# Patient Record
Sex: FEMALE | Race: WHITE
[De-identification: names, ages, dates, MRNs, and addresses within clinical notes are randomized per-mention and may not be internally consistent; named-entity substitution may affect disease eponyms.]

---

## 2008-02-24 VITALS — SYSTOLIC BLOOD PRESSURE: 160 MMHG

## 2019-08-13 ENCOUNTER — HOSPITAL ENCOUNTER (OUTPATIENT)
Dept: HOSPITAL 6 - MED/SURG | Age: 84
Setting detail: OBSERVATION
LOS: 1 days | Discharge: HOME | End: 2019-08-14
Attending: NURSE PRACTITIONER | Admitting: NURSE PRACTITIONER
Payer: MEDICARE

## 2019-08-13 VITALS — DIASTOLIC BLOOD PRESSURE: 78 MMHG | SYSTOLIC BLOOD PRESSURE: 162 MMHG

## 2019-08-13 VITALS — DIASTOLIC BLOOD PRESSURE: 80 MMHG | SYSTOLIC BLOOD PRESSURE: 162 MMHG

## 2019-08-13 VITALS — SYSTOLIC BLOOD PRESSURE: 166 MMHG | DIASTOLIC BLOOD PRESSURE: 84 MMHG

## 2019-08-13 VITALS — DIASTOLIC BLOOD PRESSURE: 75 MMHG | SYSTOLIC BLOOD PRESSURE: 128 MMHG

## 2019-08-13 VITALS — HEIGHT: 65 IN | BODY MASS INDEX: 25.34 KG/M2 | WEIGHT: 152.12 LBS

## 2019-08-13 DIAGNOSIS — E11.9: ICD-10-CM

## 2019-08-13 DIAGNOSIS — G40.909: ICD-10-CM

## 2019-08-13 DIAGNOSIS — Z88.0: ICD-10-CM

## 2019-08-13 DIAGNOSIS — R55: Primary | ICD-10-CM

## 2019-08-13 DIAGNOSIS — I10: ICD-10-CM

## 2019-08-13 DIAGNOSIS — F41.9: ICD-10-CM

## 2019-08-13 DIAGNOSIS — Z87.891: ICD-10-CM

## 2019-08-13 DIAGNOSIS — Z79.84: ICD-10-CM

## 2019-08-13 DIAGNOSIS — Z82.49: ICD-10-CM

## 2019-08-13 DIAGNOSIS — Z80.0: ICD-10-CM

## 2019-08-13 LAB
CALCIUM SERPL-MCNC: 9.2 MG/DL (ref 8.3–10.5)
CO2 SERPL-SCNC: 22 MMOL/L (ref 23–31)
GLUCOSE SERPL-MCNC: 141 MG/DL (ref 65–105)
POTASSIUM SERPL-SCNC: 4 MMOL/L (ref 3.5–5.1)
SODIUM SERPL-SCNC: 138 MMOL/L (ref 136–145)
TROPONIN I SERPL-MCNC: < 0.03 NG/ML (ref ?–0.03)

## 2019-08-13 PROCEDURE — G0378 HOSPITAL OBSERVATION PER HR: HCPCS

## 2019-08-14 VITALS — SYSTOLIC BLOOD PRESSURE: 122 MMHG | DIASTOLIC BLOOD PRESSURE: 64 MMHG

## 2019-08-14 VITALS — SYSTOLIC BLOOD PRESSURE: 163 MMHG | DIASTOLIC BLOOD PRESSURE: 79 MMHG

## 2019-08-14 VITALS — SYSTOLIC BLOOD PRESSURE: 146 MMHG | DIASTOLIC BLOOD PRESSURE: 70 MMHG

## 2019-08-14 LAB
CALCIUM SERPL-MCNC: 8.7 MG/DL (ref 8.3–10.5)
CO2 SERPL-SCNC: 24 MMOL/L (ref 23–31)
ERYTHROCYTE [DISTWIDTH] IN BLOOD BY AUTOMATED COUNT: 13.2 % (ref 11.5–14.5)
GLUCOSE SERPL-MCNC: 91 MG/DL (ref 65–105)
HCT VFR BLD AUTO: 28.9 % (ref 37–47)
HGB BLD-MCNC: 9.6 G/DL (ref 12.5–16)
LYMPHOCYTES NFR BLD MANUAL: 30 % (ref 20–51)
MCH RBC QN AUTO: 29 PG (ref 27–31)
MCHC RBC AUTO-ENTMCNC: 33 G/DL (ref 33–37)
MCV RBC AUTO: 88 FL (ref 78–100)
MONOCYTES NFR BLD: 15 % (ref 3–10)
NEUTS SEG NFR BLD MANUAL: 53 % (ref 42–75)
PLATELET # BLD AUTO: 219 K/MM3 (ref 130–400)
PMV BLD AUTO: 8.7 FL (ref 7.4–10.4)
POTASSIUM SERPL-SCNC: 4 MMOL/L (ref 3.5–5.1)
RBC # BLD AUTO: 3.28 M/MM3 (ref 4.1–5.3)
SODIUM SERPL-SCNC: 137 MMOL/L (ref 136–145)
WBC # BLD AUTO: 3.8 K/MM3 (ref 4.8–10.8)

## 2021-11-02 ENCOUNTER — HOSPITAL ENCOUNTER (INPATIENT)
Dept: HOSPITAL 19 - COL.ER | Age: 86
LOS: 4 days | Discharge: NURSING, REHAB OR LONG-TERM CARE | DRG: 481 | End: 2021-11-06
Attending: STUDENT IN AN ORGANIZED HEALTH CARE EDUCATION/TRAINING PROGRAM | Admitting: STUDENT IN AN ORGANIZED HEALTH CARE EDUCATION/TRAINING PROGRAM
Payer: MEDICARE

## 2021-11-02 VITALS — SYSTOLIC BLOOD PRESSURE: 124 MMHG | HEART RATE: 67 BPM | TEMPERATURE: 97.8 F | DIASTOLIC BLOOD PRESSURE: 55 MMHG

## 2021-11-02 VITALS — BODY MASS INDEX: 31.95 KG/M2 | WEIGHT: 180.34 LBS | HEIGHT: 62.99 IN

## 2021-11-02 VITALS — TEMPERATURE: 98.4 F | SYSTOLIC BLOOD PRESSURE: 185 MMHG | DIASTOLIC BLOOD PRESSURE: 81 MMHG | HEART RATE: 79 BPM

## 2021-11-02 VITALS — SYSTOLIC BLOOD PRESSURE: 181 MMHG | DIASTOLIC BLOOD PRESSURE: 84 MMHG

## 2021-11-02 DIAGNOSIS — N17.9: ICD-10-CM

## 2021-11-02 DIAGNOSIS — I10: ICD-10-CM
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
## 2021-11-02 DIAGNOSIS — I27.20: ICD-10-CM

## 2021-11-02 DIAGNOSIS — D64.9: ICD-10-CM
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
## 2021-11-02 DIAGNOSIS — Z79.84: ICD-10-CM

## 2021-11-02 DIAGNOSIS — E78.5: ICD-10-CM

## 2021-11-02 DIAGNOSIS — F03.90: ICD-10-CM

## 2021-11-02 DIAGNOSIS — E11.9: ICD-10-CM

## 2021-11-02 DIAGNOSIS — E87.1: ICD-10-CM

## 2021-11-02 DIAGNOSIS — F41.9: ICD-10-CM

## 2021-11-02 DIAGNOSIS — S72.142A: Primary | ICD-10-CM

## 2021-11-02 DIAGNOSIS — I16.0: ICD-10-CM

## 2021-11-02 DIAGNOSIS — G40.909: ICD-10-CM

## 2021-11-02 LAB
ANION GAP SERPL CALC-SCNC: 12 MMOL/L (ref 7–16)
BASOPHILS # BLD: 0 K/MM3 (ref 0–0.2)
BASOPHILS NFR BLD AUTO: 0.4 % (ref 0–2)
BUN SERPL-MCNC: 24 MG/DL (ref 10–20)
CALCIUM SERPL-MCNC: 9.4 MG/DL (ref 8.4–10.2)
CHLORIDE SERPL-SCNC: 105 MMOL/L (ref 98–107)
CO2 SERPL-SCNC: 23 MMOL/L (ref 23–31)
CREAT SERPL-SCNC: 1.12 MG/DL (ref 0.57–1.11)
EOSINOPHIL # BLD: 0.1 K/MM3 (ref 0–0.7)
EOSINOPHIL NFR BLD: 1.1 % (ref 0–4)
ERYTHROCYTE [DISTWIDTH] IN BLOOD BY AUTOMATED COUNT: 14 % (ref 11.5–14.5)
GLUCOSE SERPL-MCNC: 114 MG/DL (ref 70–99)
GRANULOCYTES # BLD AUTO: 71.1 % (ref 42.2–75.2)
HCT VFR BLD AUTO: 32.5 % (ref 37–47)
HGB BLD-MCNC: 10.6 G/DL (ref 12.5–16)
LYMPHOCYTES # BLD: 1 K/MM3 (ref 1.2–3.4)
LYMPHOCYTES NFR BLD: 17.7 % (ref 20–51)
MCH RBC QN AUTO: 29 PG (ref 27–31)
MCHC RBC AUTO-ENTMCNC: 33 G/DL (ref 33–37)
MCV RBC AUTO: 89 FL (ref 80–100)
MONOCYTES # BLD: 0.5 K/MM3 (ref 0.1–0.6)
MONOCYTES NFR BLD AUTO: 9.3 % (ref 1.7–9.3)
NEUTROPHILS # BLD: 3.9 K/MM3 (ref 1.4–6.5)
PH UR STRIP.AUTO: 6 [PH] (ref 5–8)
PLATELET # BLD AUTO: 230 K/MM3 (ref 130–400)
PMV BLD AUTO: 8.8 FL (ref 7.4–10.4)
POTASSIUM SERPL-SCNC: 4.1 MMOL/L (ref 3.5–4.5)
RBC # BLD AUTO: 3.64 M/MM3 (ref 4.1–5.3)
RBC # UR: (no result) /HPF
SODIUM SERPL-SCNC: 140 MMOL/L (ref 136–145)
SP GR UR STRIP.AUTO: 1.01 (ref 1–1.03)
SQUAMOUS # URNS: (no result) /HPF
URN COLLECT METHOD CLASS: (no result)
WBC # UR: (no result) /HPF

## 2021-11-02 PROCEDURE — C1751 CATH, INF, PER/CENT/MIDLINE: HCPCS

## 2021-11-02 PROCEDURE — A4314 CATH W/DRAINAGE 2-WAY LATEX: HCPCS

## 2021-11-02 PROCEDURE — C1713 ANCHOR/SCREW BN/BN,TIS/BN: HCPCS

## 2021-11-02 PROCEDURE — A9284 NON-ELECTRONIC SPIROMETER: HCPCS

## 2021-11-02 PROCEDURE — C1892 INTRO/SHEATH,FIXED,PEEL-AWAY: HCPCS

## 2021-11-02 NOTE — NUR
When report recevied per ER nurse, patient was hypertensive 200s/100s.
Requested this be brought to the providers attention and acknowledged prior to
bringing patient up to floor.

## 2021-11-02 NOTE — NUR
Patient alert, confused.  See assessment.  LLE with 1+ pulses palpable, warm
to touch.  LLE shortened and externally rotated.  Farley catheter present on
admission to unit, draining clear yellow urine.  No open areas or pressure
areas noted on skin.  No c/o at this time.

## 2021-11-03 VITALS — DIASTOLIC BLOOD PRESSURE: 74 MMHG | HEART RATE: 68 BPM | SYSTOLIC BLOOD PRESSURE: 147 MMHG

## 2021-11-03 VITALS — DIASTOLIC BLOOD PRESSURE: 80 MMHG | TEMPERATURE: 97.3 F | HEART RATE: 78 BPM | SYSTOLIC BLOOD PRESSURE: 220 MMHG

## 2021-11-03 VITALS — DIASTOLIC BLOOD PRESSURE: 80 MMHG | TEMPERATURE: 98.7 F | HEART RATE: 71 BPM | SYSTOLIC BLOOD PRESSURE: 169 MMHG

## 2021-11-03 VITALS — DIASTOLIC BLOOD PRESSURE: 54 MMHG | SYSTOLIC BLOOD PRESSURE: 117 MMHG | HEART RATE: 70 BPM

## 2021-11-03 VITALS — SYSTOLIC BLOOD PRESSURE: 115 MMHG | DIASTOLIC BLOOD PRESSURE: 49 MMHG | HEART RATE: 72 BPM

## 2021-11-03 VITALS — TEMPERATURE: 98.1 F | DIASTOLIC BLOOD PRESSURE: 65 MMHG | SYSTOLIC BLOOD PRESSURE: 129 MMHG | HEART RATE: 62 BPM

## 2021-11-03 VITALS — SYSTOLIC BLOOD PRESSURE: 209 MMHG | DIASTOLIC BLOOD PRESSURE: 85 MMHG

## 2021-11-03 VITALS — TEMPERATURE: 97.5 F | SYSTOLIC BLOOD PRESSURE: 115 MMHG | DIASTOLIC BLOOD PRESSURE: 48 MMHG | HEART RATE: 63 BPM

## 2021-11-03 VITALS — DIASTOLIC BLOOD PRESSURE: 51 MMHG | HEART RATE: 65 BPM | TEMPERATURE: 98.3 F | SYSTOLIC BLOOD PRESSURE: 116 MMHG

## 2021-11-03 VITALS — HEART RATE: 67 BPM | DIASTOLIC BLOOD PRESSURE: 85 MMHG | SYSTOLIC BLOOD PRESSURE: 143 MMHG

## 2021-11-03 VITALS — DIASTOLIC BLOOD PRESSURE: 60 MMHG | SYSTOLIC BLOOD PRESSURE: 146 MMHG | HEART RATE: 60 BPM

## 2021-11-03 VITALS — DIASTOLIC BLOOD PRESSURE: 64 MMHG | SYSTOLIC BLOOD PRESSURE: 138 MMHG

## 2021-11-03 VITALS — SYSTOLIC BLOOD PRESSURE: 150 MMHG | DIASTOLIC BLOOD PRESSURE: 65 MMHG | HEART RATE: 63 BPM | TEMPERATURE: 98.1 F

## 2021-11-03 VITALS — HEART RATE: 61 BPM | DIASTOLIC BLOOD PRESSURE: 60 MMHG | SYSTOLIC BLOOD PRESSURE: 141 MMHG

## 2021-11-03 LAB
ANION GAP SERPL CALC-SCNC: 12 MMOL/L (ref 7–16)
BUN SERPL-MCNC: 21 MG/DL (ref 10–20)
CALCIUM SERPL-MCNC: 9 MG/DL (ref 8.4–10.2)
CHLORIDE SERPL-SCNC: 101 MMOL/L (ref 98–107)
CO2 SERPL-SCNC: 24 MMOL/L (ref 23–31)
CREAT SERPL-SCNC: 0.95 MG/DL (ref 0.57–1.11)
ERYTHROCYTE [DISTWIDTH] IN BLOOD BY AUTOMATED COUNT: 13.9 % (ref 11.5–14.5)
GLUCOSE SERPL-MCNC: 147 MG/DL (ref 70–99)
HCT VFR BLD AUTO: 28 % (ref 37–47)
HGB BLD-MCNC: 9.4 G/DL (ref 12.5–16)
MCH RBC QN AUTO: 29 PG (ref 27–31)
MCHC RBC AUTO-ENTMCNC: 34 G/DL (ref 33–37)
MCV RBC AUTO: 88 FL (ref 80–100)
PLATELET # BLD AUTO: 232 K/MM3 (ref 130–400)
PMV BLD AUTO: 9.3 FL (ref 7.4–10.4)
POTASSIUM SERPL-SCNC: 3.8 MMOL/L (ref 3.5–4.5)
RBC # BLD AUTO: 3.2 M/MM3 (ref 4.1–5.3)
SODIUM SERPL-SCNC: 137 MMOL/L (ref 136–145)

## 2021-11-03 PROCEDURE — 0QS704Z REPOSITION LEFT UPPER FEMUR WITH INTERNAL FIXATION DEVICE, OPEN APPROACH: ICD-10-PCS | Performed by: ORTHOPAEDIC SURGERY

## 2021-11-03 PROCEDURE — 02HV33Z INSERTION OF INFUSION DEVICE INTO SUPERIOR VENA CAVA, PERCUTANEOUS APPROACH: ICD-10-PCS

## 2021-11-03 NOTE — NUR
Patient returns post op at 1650.  Assessment unchanged except LLE dressing
CDI, pulses palpable.  SCDs, TEDs in place.  Post op checks initiated.  No c/o
at this time.

## 2021-11-03 NOTE — NUR
Medication record received per patients daughter at shift change last night.
Dr Guerra notifed approx 1730 last night that med rec was not current.
Changes in medications per med rec noted this morning, requested Dr Guerra to
review new med rec.  Also requested HARMONY Srinivasan to review med rec.

## 2021-11-03 NOTE — NUR
attempted to meet with patient, however she was down in surgery.
SW contacted patient's daughter, Fawad to discuss discharge planning. Fawad
(ph#[phone]) advised that patient lives with her , Adrian in
[place] and sees Dr. Marcum for primary care. Patient obtains her
medications from [facility] in [place] with no difficulties. Patient
normally does not use any DME and is independent with ADLS. Fawad reports that
she is going to check patient's home for DPOA-HC and will bring it in if she
finds it. LESLIE discussed rehab options with Fawad who is in agreement that
patient will need rehab but would like to review SNF options with patient and
Adrian before sending referrals. Fawad states she will discuss this with them
tonight so that referrals can be sent tomorrow.
 
Discharge Plan: Post acute rehab, SW to follow up on preferences tomorrow.

## 2021-11-03 NOTE — NUR
Patient alert, confused.  Repeats questions and statements.  LLE with pulses
palpable, sensation intact.  LLE shortened and externally rotated.  Farley
catheter in place, patent, draining clear yellow urine.  SCDs and TEDs in
place.  No c/o at this time.

## 2021-11-03 NOTE — NUR
ALERT AND OX2. SELF AND SITUATION. LT HIP FX. BEDREST. . TELE NS. WIN TO DD
YELLOW CLEAR URINE.  RT AC, FLUIDS STARTED AFTER NPO STATUS. NORCO FOR PAIN.
POC DISCUSSED. MED REC UPDATED. RA. POC DISCUSSED W PT WITH AND VU OF CARE.
CALL LIGHT WI REACH AND HAS USED APPROPRIATLY TONIGHT. NPO NOW.

## 2021-11-04 VITALS — TEMPERATURE: 97.4 F | DIASTOLIC BLOOD PRESSURE: 53 MMHG | HEART RATE: 74 BPM | SYSTOLIC BLOOD PRESSURE: 128 MMHG

## 2021-11-04 VITALS — TEMPERATURE: 98.3 F | SYSTOLIC BLOOD PRESSURE: 129 MMHG | DIASTOLIC BLOOD PRESSURE: 52 MMHG | HEART RATE: 72 BPM

## 2021-11-04 VITALS — HEART RATE: 64 BPM | DIASTOLIC BLOOD PRESSURE: 44 MMHG | SYSTOLIC BLOOD PRESSURE: 100 MMHG

## 2021-11-04 VITALS — TEMPERATURE: 97.1 F | DIASTOLIC BLOOD PRESSURE: 46 MMHG | SYSTOLIC BLOOD PRESSURE: 100 MMHG | HEART RATE: 62 BPM

## 2021-11-04 VITALS — DIASTOLIC BLOOD PRESSURE: 37 MMHG | TEMPERATURE: 97.8 F | SYSTOLIC BLOOD PRESSURE: 89 MMHG | HEART RATE: 59 BPM

## 2021-11-04 VITALS — HEART RATE: 74 BPM | DIASTOLIC BLOOD PRESSURE: 58 MMHG | SYSTOLIC BLOOD PRESSURE: 129 MMHG | TEMPERATURE: 98.3 F

## 2021-11-04 VITALS — HEART RATE: 72 BPM | TEMPERATURE: 98.4 F | DIASTOLIC BLOOD PRESSURE: 48 MMHG | SYSTOLIC BLOOD PRESSURE: 106 MMHG

## 2021-11-04 VITALS — HEART RATE: 70 BPM | TEMPERATURE: 97.9 F | SYSTOLIC BLOOD PRESSURE: 115 MMHG | DIASTOLIC BLOOD PRESSURE: 61 MMHG

## 2021-11-04 LAB
ANION GAP SERPL CALC-SCNC: 9 MMOL/L (ref 7–16)
BUN SERPL-MCNC: 18 MG/DL (ref 10–20)
CALCIUM SERPL-MCNC: 7.9 MG/DL (ref 8.4–10.2)
CHLORIDE SERPL-SCNC: 103 MMOL/L (ref 98–107)
CO2 SERPL-SCNC: 22 MMOL/L (ref 23–31)
CREAT SERPL-SCNC: 0.91 MG/DL (ref 0.57–1.11)
CREAT SERPL-SCNC: 0.91 MG/DL (ref 0.57–1.11)
FRACT EXCRET NA UR+SERPL-RTO: 0.1 %
GLUCOSE SERPL-MCNC: 134 MG/DL (ref 70–99)
HCT VFR BLD AUTO: 24.4 % (ref 37–47)
HGB BLD-MCNC: 8 G/DL (ref 12.5–16)
POTASSIUM SERPL-SCNC: 3.6 MMOL/L (ref 3.5–4.5)
SODIUM SERPL-SCNC: 134 MMOL/L (ref 136–145)
SODIUM SERPL-SCNC: 134 MMOL/L (ref 136–145)

## 2021-11-04 NOTE — NUR
Uneventful day, up to the recliner for a large part of the day. Pain minimal,
but controlled with scheduled Tylenol and ice to site. Pt tolerating diet
without issues. No needs at this time.

## 2021-11-04 NOTE — NUR
PT RESTING QUIETLY IN BED, IS PLEASANTLY CONFUSED, STATES THAT SHE DOESN'T
REMEMBER WHAT HAPPENED TO PUT HER HERE. ATTEMPTED TO RE-ORIENT PT BUT SHE
QUICKLY FORGETS AGAIN. PT DENIES ANY PAIN @ THIS TIME. DRESSING TO LEFT HIP
CDI. WIN CATHETER DRAINING CLEAR YELLOW URINE. IVF INFUSING INTO PICC.
RESPIRATIONS UNLABORED. BED ALARM IS ON, CALL LIGHT WITHIN REACH.

## 2021-11-04 NOTE — NUR
Visited with the patient and the patients daughter Fawad in patients room that
[facility] has accepted the patient once she is ready for dc. Fawad states
that as long as there is assistance with getting the patient in the car and
out of the car, she will be able to transport the patient.

## 2021-11-04 NOTE — NUR
Pt assessment complete. Pt laying in bed upon entry, she is drowsy but arouses
to voice. She is not oriented to place, she continues to tell staff she is
tired and occasionally complains of pain to L hip with any movement. Dressing
to L hip CDI, will change dressing later this am. This nurse assisted PT with
transferring patient to the chair, pt became very drowsy and diaphoretic with
movement. Vitals attained and Dr. Alexandre notified. Student nurse in with patient,
reviewed medications. Martine DD. Fluids dc'd from PICC line to RUE. Chair alarm
in place.

## 2021-11-04 NOTE — NUR
Pt had a dressing change of left hip incisions. There are 3 incisions. The
amount of staples in each were 2, 12, and 12. The measurements were 0.6 cm, 7
cm, and 6.5 cm. Applied a 12 inch aquacell to the area. It was clean, dry, and
intact. The incisions looked like they were clear of drainage and signs of
infection. Pt is resting in bed now. Performed by VALERIEN rosalino Mtz and
instructor Hannah

## 2021-11-04 NOTE — NUR
met with patient to follow up on discharge plan. Patient is
drowsy and requests SW speak with her daughter, Fawad.
 
SW contacted Fawad to follow up on rehab preferences. Fawad advised their
preferences are 1) [facility] in [place] and 2) [facility]. SW
contacted both facilities and faxed referrals. Chanda at [facility] advised they
are able to accept.
 
Discharge Plan: [facility] in [place]

## 2021-11-05 VITALS — DIASTOLIC BLOOD PRESSURE: 67 MMHG | SYSTOLIC BLOOD PRESSURE: 114 MMHG | HEART RATE: 66 BPM

## 2021-11-05 VITALS — SYSTOLIC BLOOD PRESSURE: 133 MMHG | DIASTOLIC BLOOD PRESSURE: 62 MMHG | TEMPERATURE: 98.1 F | HEART RATE: 72 BPM

## 2021-11-05 VITALS — SYSTOLIC BLOOD PRESSURE: 137 MMHG | TEMPERATURE: 98 F | HEART RATE: 70 BPM | DIASTOLIC BLOOD PRESSURE: 52 MMHG

## 2021-11-05 VITALS — SYSTOLIC BLOOD PRESSURE: 134 MMHG | HEART RATE: 66 BPM | DIASTOLIC BLOOD PRESSURE: 59 MMHG

## 2021-11-05 VITALS — DIASTOLIC BLOOD PRESSURE: 50 MMHG | HEART RATE: 70 BPM | TEMPERATURE: 98.3 F | SYSTOLIC BLOOD PRESSURE: 135 MMHG

## 2021-11-05 LAB
ANION GAP SERPL CALC-SCNC: 8 MMOL/L (ref 7–16)
BUN SERPL-MCNC: 19 MG/DL (ref 10–20)
CALCIUM SERPL-MCNC: 8 MG/DL (ref 8.4–10.2)
CHLORIDE SERPL-SCNC: 101 MMOL/L (ref 98–107)
CO2 SERPL-SCNC: 22 MMOL/L (ref 23–31)
CREAT SERPL-SCNC: 0.83 MG/DL (ref 0.57–1.11)
GLUCOSE SERPL-MCNC: 129 MG/DL (ref 70–99)
HCT VFR BLD AUTO: 21.1 % (ref 37–47)
HCT VFR BLD AUTO: 21.5 % (ref 37–47)
HGB BLD-MCNC: 7.2 G/DL (ref 12.5–16)
HGB BLD-MCNC: 7.3 G/DL (ref 12.5–16)
POTASSIUM SERPL-SCNC: 3.7 MMOL/L (ref 3.5–4.5)
SODIUM SERPL-SCNC: 131 MMOL/L (ref 136–145)

## 2021-11-05 NOTE — NUR
PATIENT IS ALERT AND ORIENTED TO SELF. PATIENT HAS AQUACELL TO LEFT HIP.
PATIENT HAS ICE TO LEFT HIP AND SCDS ON BILATERAL LOWER EXTREMITIES. PATIENT
WIN WAS DISCONTINUED TODAY AND SHE WAS ABLE TO VOID AT THE BEDSIDE COMMODE.
2X ASSIST WITH GAIT BELT AND WALKER. PATIENT IS ON 500CC FLUID RESTIRCTION AND
IS Q6 ACCUCHECKS.PATIENT IS ON TELE. PATIENT HAS PICC TO RIGHT UPPER ARM.
PATIENT TO DISCHARGE TOMORROW BACK TO [place]. PATIENT DENIES PAIN OR
FURTHER NEEDS AT THIS TIME. CALL LIGHT WITHIN REACH. HEAD TO TOE ASSESSMENT
COMPLETE.

## 2021-11-05 NOTE — NUR
PATIENT GOING DOWN TO SURGERY VIA BED. CONSENT ON CHART. 2 UNITS OF BLOOD ON
HOLD IN LAB, IF NEEDED IN THE OR. PRE-OP FLUIDS INFUSING VIA GRAVITY. PATIENT
OFF FLOOR.

## 2021-11-05 NOTE — NUR
spoke with HARMONY Srinivasan who advised patient will likely be ready
for discharge tomorrow. LESLIE contacted Chanda at [facility] who advised they can
accept tomorrow as long as patient's daughter, Fawad completes paperwork today
and is okay transporting patient. LESLIE followed up with Fawad who advised she
will go to [facility] today to do paperwork and is comfortable transporting
patient as long as she has assistance getting patient in and out of the car.
Fawad will be here tomorrow at 1300 to  patient. LESLIE notified RNBianca.
 
Chanda at [facility] contacted LESLIE and advised Fawad completed the necessary
paperwork and they will accept tomorrow.
 
Discharge Plan: [facility]

## 2021-11-05 NOTE — NUR
PATIENT SLEPT MOST OF NIGHT. WHEN WOKEN UP SHE WAS VERY CONFUSED. REORIENTED
TO PLACE. NO FURTHER NEEDS AT THIS TIME. WILL REPORT TO DAY SHIFT.

## 2021-11-05 NOTE — NUR
Pt is resting in recliner. Ate a small amount of breakfast but stated she did
not want it or lunch today. Repeats a lot that she is tired and just wants to
rest. PICC line in right upper arm shows no signs of infiltration or phlebitis
and flushes well. Postop incisions to left hip with an aquacell over it. Pt is
not transferring well. Not oriented to time and place.
ADN Student, Smith

## 2021-11-06 VITALS — TEMPERATURE: 98.6 F | DIASTOLIC BLOOD PRESSURE: 76 MMHG | HEART RATE: 81 BPM | SYSTOLIC BLOOD PRESSURE: 111 MMHG

## 2021-11-06 VITALS — TEMPERATURE: 97.6 F | SYSTOLIC BLOOD PRESSURE: 133 MMHG | DIASTOLIC BLOOD PRESSURE: 60 MMHG | HEART RATE: 62 BPM

## 2021-11-06 VITALS — DIASTOLIC BLOOD PRESSURE: 65 MMHG | SYSTOLIC BLOOD PRESSURE: 149 MMHG | HEART RATE: 69 BPM | TEMPERATURE: 98.3 F

## 2021-11-06 VITALS — DIASTOLIC BLOOD PRESSURE: 73 MMHG | HEART RATE: 65 BPM | TEMPERATURE: 97.8 F | SYSTOLIC BLOOD PRESSURE: 164 MMHG

## 2021-11-06 LAB
ANION GAP SERPL CALC-SCNC: 8 MMOL/L (ref 7–16)
BUN SERPL-MCNC: 17 MG/DL (ref 10–20)
CALCIUM SERPL-MCNC: 8 MG/DL (ref 8.4–10.2)
CHLORIDE SERPL-SCNC: 97 MMOL/L (ref 98–107)
CO2 SERPL-SCNC: 22 MMOL/L (ref 23–31)
CREAT SERPL-SCNC: 0.76 MG/DL (ref 0.57–1.11)
GLUCOSE SERPL-MCNC: 138 MG/DL (ref 70–99)
HCT VFR BLD AUTO: 21.8 % (ref 37–47)
HGB BLD-MCNC: 7.2 G/DL (ref 12.5–16)
POTASSIUM SERPL-SCNC: 4 MMOL/L (ref 3.5–4.5)
SODIUM SERPL-SCNC: 127 MMOL/L (ref 136–145)

## 2021-11-06 NOTE — NUR
DC'D RIGHT UPPER ARM PICC LINE PER HOSPITALIST ORDERS FOR PENDING DISCHARGE.
PICC LINE TIP INTACT AND PATIENT TOLERATED WELL. COVERED SITE WITH GAUZE &
OCCLUSIVE DRESSING.

## 2021-11-06 NOTE — NUR
PATIENT GIVEN ZOFRAN PER ORDERS FOR NAUSEA. PATIENT DRY HEAVED AND THREW UP A
SMALL AMOUNT. SAYS SHE IS FEELING A LITTLE BETTER AFTER THE MEDICINE.

## 2021-11-06 NOTE — NUR
PATIENT IS ORIENTED X2 BUT DISPLAYS SOME FORGETFULNESS. PATIENT HAS HX OF
DEMENTIA. VSS ON TELE. PATIENT C/O PAIN IN LLE. GAVE PRN ROXICODONE WITH AM
MEDS. LEFT HIP DRESSING IS CD&I WITH AQUACEL. TEDS & SCD'S TO BLE. POSITIVE
PEDAL PULSES TO BLE. PATIENT REPORTS DECREASED APPETITE BUT NO C/O N/V.
PATIENT ONLY ATE 35% OF BREAKFAST AND REPORTS SHE JUST WANTS TO SLEEP IN
TODAY. HEAD TO TOE ASSESSMENT COMPLETE. LIGHTS TURNED DOWN. CALL LIGHT IN
REACH. BED ALARM ON. CALL LIGHT IN REACH.

## 2021-11-06 NOTE — NUR
IZZY SLEPT THROUGH MOST OF NIGHT. THREW UP ONCE, GIVEN ZOFRAN PER ORDERS.
PATIENT TO DISCHARGE TODAY. NO FURTHER NEEDS AT THIS TIME. WILL REPORT TO
DAYSHIFT.

## 2021-11-06 NOTE — NUR
PATIENT STILL VERY DROWSY FROM PAIN PILL THIS AM. VSS. HOSPITALIST CLEARED FOR
DISCHARGE. DAUGHTER & FRIEND AT BEDSIDE. INFO PACKET GIVEN TO DAUGHTER.
PATIENT IS DRESSED, PACKED AND DISCHARGED VIA WC TO PERSONAL VEHICLE. CALLED
[facility] AND GAVE REPORT.